# Patient Record
Sex: MALE | Race: WHITE | NOT HISPANIC OR LATINO
[De-identification: names, ages, dates, MRNs, and addresses within clinical notes are randomized per-mention and may not be internally consistent; named-entity substitution may affect disease eponyms.]

---

## 2017-12-19 PROBLEM — Z00.00 ENCOUNTER FOR PREVENTIVE HEALTH EXAMINATION: Status: ACTIVE | Noted: 2017-12-19

## 2018-02-21 ENCOUNTER — APPOINTMENT (OUTPATIENT)
Dept: VASCULAR SURGERY | Facility: CLINIC | Age: 58
End: 2018-02-21

## 2022-05-31 ENCOUNTER — APPOINTMENT (OUTPATIENT)
Dept: PAIN MANAGEMENT | Facility: CLINIC | Age: 62
End: 2022-05-31
Payer: COMMERCIAL

## 2022-05-31 VITALS
DIASTOLIC BLOOD PRESSURE: 84 MMHG | HEIGHT: 76 IN | TEMPERATURE: 98 F | BODY MASS INDEX: 38.36 KG/M2 | SYSTOLIC BLOOD PRESSURE: 164 MMHG | WEIGHT: 315 LBS

## 2022-05-31 DIAGNOSIS — M25.552 PAIN IN RIGHT HIP: ICD-10-CM

## 2022-05-31 DIAGNOSIS — M25.551 PAIN IN RIGHT HIP: ICD-10-CM

## 2022-05-31 DIAGNOSIS — Z87.39 PERSONAL HISTORY OF OTHER DISEASES OF THE MUSCULOSKELETAL SYSTEM AND CONNECTIVE TISSUE: ICD-10-CM

## 2022-05-31 PROCEDURE — 99204 OFFICE O/P NEW MOD 45 MIN: CPT

## 2022-05-31 RX ORDER — ESCITALOPRAM OXALATE 20 MG/1
20 TABLET ORAL
Qty: 30 | Refills: 0 | Status: DISCONTINUED | COMMUNITY
Start: 2021-05-17

## 2022-05-31 RX ORDER — METFORMIN HYDROCHLORIDE 500 MG/1
500 TABLET, COATED ORAL
Refills: 0 | Status: DISCONTINUED | COMMUNITY

## 2022-05-31 RX ORDER — AFLIBERCEPT 40 MG/ML
2 INJECTION, SOLUTION INTRAVITREAL
Qty: 1 | Refills: 0 | Status: DISCONTINUED | COMMUNITY
Start: 2021-10-15

## 2022-05-31 NOTE — HISTORY OF PRESENT ILLNESS
[6] : 1. What number best describes your pain on average in the past week? 6/10 pain [5] : 3. What number best describes how, during the past week, pain has interfered with your general activity? 5/10 pain [___ mths] : [unfilled] month(s) ago [Constant] : constant [7] : a minimum pain level of 7/10 [8] : a maximum pain level of 8/10 [Sharp] : sharp [Dull] : dull [Aching] : aching [Walking] : walking [Medications] : medications [Heat] : heat [Ice] : ice [FreeTextEntry1] : 61 yom presents w/ severe low back and hip pain. He has undergone multiple interventions with his family member  Dr. Sims previously with relief. Pain in his low back is extreme. Quality of life is impaired. There has been a severe exacerbation of the patient's chronic pain. He does not have the severe radicular pain that he has had previously. \par \par Interventions:\par C7-T1 Interlaminar WILLIS (07/15/20):\par L4-L5 Interlaminar WILLIS (07/13/18):\par Right L5-S1 TFESI (06/20/18):\par C7-T1 interlaminar WILLIS (12/12/16):\par Cervical WILLIS (12/03/15):\par Cerviadl WILLIS (11/16/15):\par  [FreeTextEntry2] : 16 [FreeTextEntry7] : Referred by Dr. Sims. Lower back and left hip  [FreeTextEntry4] : stretch

## 2022-05-31 NOTE — DATA REVIEWED
[FreeTextEntry1] : Exam Date: 06/20/18 \par Exam: MRI LUMBAR SPINE \par  \par The lumbar curvature and alignment is grossly maintained. The marrow signal is overall within normal limits with no focal marrow replacing lesion identified. \par An atypical hemangioma is noted in the L1 vertebral body. The vertebral body heights are overall maintained and there is no evidence of acute fracture or \par subluxation. Degenerative endplate signal changes are noted surrounding the L5-S1 intervertebral disc. There is otherwise no abnormal vertebral or paraspinal \par edema. \par In the visualized paraspinal soft tissues, there is dilatation of the right renal pelvis and calyces without dilatation of the right ureter. There is no \par definite edema around the right kidney to suggest acute obstruction. The paraspinal soft tissues otherwise appear grossly unremarkable. \par The conus medullaris terminates at L1 and the thecal sac terminates at S2. No abnormal signal is identified in the spinal cord. \par L1-2: No significant disc bulge or herniation. No significant central canal, subarticular, or foraminal stenosis. \par L2-3: No significant disc bulge or herniation. Mild facet/ligamentous hypertrophy. No significant central canal, subarticular, or foraminal stenosis. \par L3-4: No significant disc bulge or herniation. Mild facet/ligament hypertrophy. No significant central canal, subarticular, or foraminal stenosis. Focal \par anterolateral productive change of the right facet joint results mildly encroaches upon the exiting L3 nerve root. \par L4-5: Mild disc bulge with a small annular fissure. Mild facet/ligamentous hypertrophy. No significant central canal stenosis. Mild bilateral subarticular zone \par stenosis. Mild to moderate left foraminal stenosis and no significant right foraminal stenosis. \par L5-S1: Central/left paracentral disc extrusion, right paracentral/foraminal disc protrusion, and left foraminal disc protrusion. No significant central canal \par stenosis. Bilateral subarticular zone stenosis with encroachment upon both descending S1 nerve roots. Severe left foraminal stenosis with encroachment upon the \par exiting left L5 nerve. Moderate right foraminal stenosis. \par IMPRESSION: \par 1. Disc herniations at L5-S1 encroach upon both descending S1 nerve roots and the exiting left L5 nerve root. \par 2. At L3-4, right facet hypertrophy encroaches upon the exiting right L3 nerve root. \par 3. Lumbar spondylosis as above. No significant central canal stenosis. \par 4. Dilatation of the right renal pelvis and calyces. Further evaluation CT urogram and urologic consultation is recommended, if this has not already been \par evaluated at an outside institution.

## 2022-05-31 NOTE — PHYSICAL EXAM

## 2022-06-17 ENCOUNTER — APPOINTMENT (OUTPATIENT)
Dept: PAIN MANAGEMENT | Facility: CLINIC | Age: 62
End: 2022-06-17
Payer: COMMERCIAL

## 2022-06-17 PROCEDURE — 99214 OFFICE O/P EST MOD 30 MIN: CPT

## 2022-06-17 NOTE — HISTORY OF PRESENT ILLNESS
[___ mths] : [unfilled] month(s) ago Is This A New Presentation, Or A Follow-Up?: Facial Scar [Constant] : constant [7] : a minimum pain level of 7/10 [8] : a maximum pain level of 8/10 [Sharp] : sharp [Dull] : dull [Aching] : aching [Walking] : walking [Medications] : medications [Heat] : heat [Ice] : ice [6] : 1. What number best describes your pain on average in the past week? 6/10 pain [5] : 3. What number best describes how, during the past week, pain has interfered with your general activity? 5/10 pain [FreeTextEntry1] : Interval History:\par Patient returns today to review recent MRI. Pain remains severe. Pain is worst in his back. He still does have left low back pain that causes a dull ache in his testicle. \par \par HPI: 61 yom presents w/ severe low back and hip pain. He has undergone multiple interventions with his family member  Dr. Sims previously with relief. Pain in his low back is extreme. Quality of life is impaired. There has been a severe exacerbation of the patient's chronic pain. He does not have the severe radicular pain that he has had previously. \par \par Interventions:\par C7-T1 Interlaminar WILLIS (07/15/20):\par L4-L5 Interlaminar WILLIS (07/13/18):\par Right L5-S1 TFESI (06/20/18):\par C7-T1 interlaminar WILLIS (12/12/16):\par Cervical WILLIS (12/03/15):\par Cerviadl WILLIS (11/16/15):\par  [FreeTextEntry7] : Referred by Dr. Sims. Lower back and left hip  [FreeTextEntry4] : stretch  [FreeTextEntry2] : 16

## 2022-06-17 NOTE — ASSESSMENT
[FreeTextEntry1] : 61 yom presents w/ severe low back pain. Quality of life is impaired. There has been a severe exacerbation of the patient's chronic pain. \par \par I have personally reviewed the patient's MRI in detail and discussed it with them which is significant for multiple levels of foraminal and central stenosis and facet arthropathy.\par \par The patient has moderate to severe chronic axial back pain which has been present for at least three months and has failed to improve with conservative therapy. There is no untreated radiculopathy. There is no other known cause of axial back pain in this patient. The patient has failed to have relief with medication management and physical therapy. Given the patients failure to improve with all other conservative measures, recommend bilateral L3, L4, and L5 medial branch diagnostic block under fluoroscopic guidance. If the patient has significant relief of pain and improved quality of life following diagnostic block, will proceed to radiofrequency ablation.\par \par I have discussed in detail with the patient that an interventional spine procedure is associated with potential risks. The procedure may include an injection of steroid and potentially other medications (local anesthetic and normal saline) into the epidural space or surrounding tissue of the spine. There are significant risks of this procedure which include and are not limited to infection, bleeding, worsening pain, dural puncture leading to post-dural puncture headache, nerve damage, spinal cord injury, paralysis, stroke, and death. There is a chance that the procedure does not improve their pain. There are risks associated with the steroid being absorbed into the body systemically. These include dysphoria, difficulty sleeping, mood swings, and personality changes. Pre-menopausal women may notice a regularity his in her menstrual cycle for 2-3 months following the injection. Steroids can specifically affect patients with hypertension, diabetes, and peptic ulcers. The procedure may cause a temporary increase in blood pressure and blood glucose, and may adversely affect a peptic ulcer. Other, more rare complications, including avascular necrosis of the joints, glaucoma, and osteoporosis. I have discussed the risks of the procedure at length with the patient, and the potential benefits of pain relief. I have offered alternatives to the procedure. All questions were answered. The patient expressed understanding and wishes to proceed with the procedure.\par \par Physical therapy prescribed - goal will be to increase ROM, strengthening, postural training, other modalities ad yash which may include massage and stim. Goals of therapy discussed with the patient in detail and will be discussed with physical therapist. Patient will follow-up following course of physical therapy to monitor progress and adjust therapy as needed.\par \par Acetaminophen 1,000 mg q8h prn for moderate pain. Risks, benefits, and alternatives of acetaminophen discussed with patient.\par \par Diet and nutritional strategies discussed which may improve patients pain and will improve overall health.\par

## 2022-06-17 NOTE — PHYSICAL EXAM

## 2022-06-17 NOTE — DATA REVIEWED
[FreeTextEntry1] : MRI Lumbar Spine (2022):\par L4-L5: diffuse disc bulge extending into the foramina bilaterally\par L5-S1: posterior disc herniation more prominent to the left. Impinging on the L5 and S1 nerve roots.\par \par Exam Date: 06/20/18 \par Exam: MRI LUMBAR SPINE \par  \par The lumbar curvature and alignment is grossly maintained. The marrow signal is overall within normal limits with no focal marrow replacing lesion identified. \par An atypical hemangioma is noted in the L1 vertebral body. The vertebral body heights are overall maintained and there is no evidence of acute fracture or \par subluxation. Degenerative endplate signal changes are noted surrounding the L5-S1 intervertebral disc. There is otherwise no abnormal vertebral or paraspinal \par edema. \par In the visualized paraspinal soft tissues, there is dilatation of the right renal pelvis and calyces without dilatation of the right ureter. There is no \par definite edema around the right kidney to suggest acute obstruction. The paraspinal soft tissues otherwise appear grossly unremarkable. \par The conus medullaris terminates at L1 and the thecal sac terminates at S2. No abnormal signal is identified in the spinal cord. \par L1-2: No significant disc bulge or herniation. No significant central canal, subarticular, or foraminal stenosis. \par L2-3: No significant disc bulge or herniation. Mild facet/ligamentous hypertrophy. No significant central canal, subarticular, or foraminal stenosis. \par L3-4: No significant disc bulge or herniation. Mild facet/ligament hypertrophy. No significant central canal, subarticular, or foraminal stenosis. Focal \par anterolateral productive change of the right facet joint results mildly encroaches upon the exiting L3 nerve root. \par L4-5: Mild disc bulge with a small annular fissure. Mild facet/ligamentous hypertrophy. No significant central canal stenosis. Mild bilateral subarticular zone \par stenosis. Mild to moderate left foraminal stenosis and no significant right foraminal stenosis. \par L5-S1: Central/left paracentral disc extrusion, right paracentral/foraminal disc protrusion, and left foraminal disc protrusion. No significant central canal \par stenosis. Bilateral subarticular zone stenosis with encroachment upon both descending S1 nerve roots. Severe left foraminal stenosis with encroachment upon the \par exiting left L5 nerve. Moderate right foraminal stenosis. \par IMPRESSION: \par 1. Disc herniations at L5-S1 encroach upon both descending S1 nerve roots and the exiting left L5 nerve root. \par 2. At L3-4, right facet hypertrophy encroaches upon the exiting right L3 nerve root. \par 3. Lumbar spondylosis as above. No significant central canal stenosis. \par 4. Dilatation of the right renal pelvis and calyces. Further evaluation CT urogram and urologic consultation is recommended, if this has not already been \par evaluated at an outside institution.

## 2022-07-12 ENCOUNTER — RESULT REVIEW (OUTPATIENT)
Age: 62
End: 2022-07-12

## 2022-07-12 ENCOUNTER — TRANSCRIPTION ENCOUNTER (OUTPATIENT)
Age: 62
End: 2022-07-12

## 2022-07-12 ENCOUNTER — APPOINTMENT (OUTPATIENT)
Dept: PAIN MANAGEMENT | Facility: HOSPITAL | Age: 62
End: 2022-07-12

## 2022-07-14 ENCOUNTER — APPOINTMENT (OUTPATIENT)
Dept: PAIN MANAGEMENT | Facility: CLINIC | Age: 62
End: 2022-07-14

## 2022-07-14 PROCEDURE — 99214 OFFICE O/P EST MOD 30 MIN: CPT | Mod: 95

## 2022-07-14 NOTE — PHYSICAL EXAM
[de-identified] : Constitutional: Well-developed, in no acute distress\par \par Psychiatric: Appropriate mood and affect, oriented to time, place, person, and situation\par

## 2022-07-14 NOTE — ASSESSMENT
[FreeTextEntry1] : 61 yom presents w/ severe low back pain into the left leg. No relief w/ lumbar medial branch block. Quality of life is impaired. There has been a severe exacerbation of the patient's chronic pain. \par \par I have personally reviewed the patient's MRI in detail and discussed it with them which is significant for multiple levels of foraminal and central stenosis and facet arthropathy.\par \par The patient has failed to have relief with medication management. The patient has failed to have relief with more then six weeks of physical therapy within the last three months. Given the patients failure to improve with all other conservative measures, recommend L5-S1 interlaminar epidural steroid injection under fluoroscopic guidance. The patient will follow-up with me in my office two weeks following intervention.\par \par \par I have discussed in detail with the patient that an interventional spine procedure is associated with potential risks. The procedure may include an injection of steroid and potentially other medications (local anesthetic and normal saline) into the epidural space or surrounding tissue of the spine. There are significant risks of this procedure which include and are not limited to infection, bleeding, worsening pain, dural puncture leading to post-dural puncture headache, nerve damage, spinal cord injury, paralysis, stroke, and death. There is a chance that the procedure does not improve their pain. There are risks associated with the steroid being absorbed into the body systemically. These include dysphoria, difficulty sleeping, mood swings, and personality changes. Pre-menopausal women may notice a regularity his in her menstrual cycle for 2-3 months following the injection. Steroids can specifically affect patients with hypertension, diabetes, and peptic ulcers. The procedure may cause a temporary increase in blood pressure and blood glucose, and may adversely affect a peptic ulcer. Other, more rare complications, including avascular necrosis of the joints, glaucoma, and osteoporosis. I have discussed the risks of the procedure at length with the patient, and the potential benefits of pain relief. I have offered alternatives to the procedure. All questions were answered. The patient expressed understanding and wishes to proceed with the procedure.\par \par Physical therapy prescribed - goal will be to increase ROM, strengthening, postural training, other modalities ad yash which may include massage and stim. Goals of therapy discussed with the patient in detail and will be discussed with physical therapist. Patient will follow-up following course of physical therapy to monitor progress and adjust therapy as needed.\par \par Acetaminophen 1,000 mg q8h prn for moderate pain. Risks, benefits, and alternatives of acetaminophen discussed with patient.\par \par Diet and nutritional strategies discussed which may improve patients pain and will improve overall health.\par \par Patient is scheduled for procedure based on history, imaging and limited physical exam performed on TeleHealth visit.  If necessary, additional focal physical exam will be performed on date of procedure\par \par

## 2022-07-14 NOTE — HISTORY OF PRESENT ILLNESS
[___ mths] : [unfilled] month(s) ago [Constant] : constant [7] : a minimum pain level of 7/10 [8] : a maximum pain level of 8/10 [Sharp] : sharp [Dull] : dull [Aching] : aching [Walking] : walking [Medications] : medications [Heat] : heat [Ice] : ice [6] : 1. What number best describes your pain on average in the past week? 6/10 pain [5] : 3. What number best describes how, during the past week, pain has interfered with your general activity? 5/10 pain [FreeTextEntry1] : Verbal consent was given by/on: Graham Schofield on 07/14/22\par \par Patient location: Home\par \par Physician location: Office\par \par Reason for Telehealth visit: Back pain\par \par He is now s/p bilateral L3, L4, and L5 medial branch block. He had some improvement in range of motion but not substantial pain relief. Pain in his back into the left leg remains severe. Quality of life is impaired. There has been a severe exacerbation of the patient's chronic pain. No relief w/ six weeks of NSAIDS. No relief w/ PT. \par \par This service took place using a two way audio and visual platform. The patient and Dr. Lee were both able to see each other and communicate through video. There were no barriers to communication. Greater then 50% of the time spent in the encounter involved counseling and coordination of care. \par \par Time spent on visit: 30 minutes\par \par HPI: 61 yom presents w/ severe low back and hip pain. He has undergone multiple interventions with his family member  Dr. Sims previously with relief. Pain in his low back is extreme. Quality of life is impaired. There has been a severe exacerbation of the patient's chronic pain. He does not have the severe radicular pain that he has had previously. \par \par He does have left low back pain that causes aching in his testicle.\par \par Interventions:\par C7-T1 Interlaminar WILLIS (07/15/20):\par L4-L5 Interlaminar WILLIS (07/13/18):\par Right L5-S1 TFESI (06/20/18):\par C7-T1 interlaminar WILLIS (12/12/16):\par Cervical WILLIS (12/03/15):\par Cerviadl WILLIS (11/16/15):\par  [FreeTextEntry7] : Referred by Dr. Sims. Lower back and left hip  [FreeTextEntry4] : stretch  [FreeTextEntry2] : 16

## 2022-11-17 ENCOUNTER — NON-APPOINTMENT (OUTPATIENT)
Age: 62
End: 2022-11-17

## 2022-11-17 ENCOUNTER — APPOINTMENT (OUTPATIENT)
Dept: PAIN MANAGEMENT | Facility: CLINIC | Age: 62
End: 2022-11-17

## 2022-11-17 PROCEDURE — 99443: CPT

## 2022-11-17 RX ORDER — MUPIROCIN 20 MG/G
2 OINTMENT TOPICAL
Qty: 22 | Refills: 0 | Status: ACTIVE | COMMUNITY
Start: 2022-11-01

## 2022-11-17 RX ORDER — TIZANIDINE 2 MG/1
2 TABLET ORAL
Qty: 45 | Refills: 0 | Status: ACTIVE | COMMUNITY
Start: 2022-11-17 | End: 1900-01-01

## 2022-11-17 RX ORDER — AMOXICILLIN AND CLAVULANATE POTASSIUM 875; 125 MG/1; MG/1
875-125 TABLET, COATED ORAL
Qty: 20 | Refills: 0 | Status: ACTIVE | COMMUNITY
Start: 2022-11-01

## 2022-11-22 ENCOUNTER — APPOINTMENT (OUTPATIENT)
Dept: PAIN MANAGEMENT | Facility: CLINIC | Age: 62
End: 2022-11-22

## 2022-11-22 VITALS
HEIGHT: 76 IN | TEMPERATURE: 98 F | WEIGHT: 315 LBS | DIASTOLIC BLOOD PRESSURE: 81 MMHG | BODY MASS INDEX: 38.36 KG/M2 | SYSTOLIC BLOOD PRESSURE: 155 MMHG

## 2022-11-22 DIAGNOSIS — M47.817 SPONDYLOSIS W/OUT MYELOPATHY OR RADICULOPATHY, LUMBOSACRAL REGION: ICD-10-CM

## 2022-11-22 DIAGNOSIS — Z87.39 PERSONAL HISTORY OF OTHER DISEASES OF THE MUSCULOSKELETAL SYSTEM AND CONNECTIVE TISSUE: ICD-10-CM

## 2022-11-22 PROCEDURE — 99214 OFFICE O/P EST MOD 30 MIN: CPT

## 2022-11-22 NOTE — HISTORY OF PRESENT ILLNESS
[5] : 3. What number best describes how, during the past week, pain has interfered with your general activity? 5/10 pain [6] : 1. What number best describes your pain on average in the past week? 6/10 pain [___ mths] : [unfilled] month(s) ago [Constant] : constant [7] : a minimum pain level of 7/10 [8] : a maximum pain level of 8/10 [Sharp] : sharp [Dull] : dull [Aching] : aching [Walking] : walking [Medications] : medications [Heat] : heat [Ice] : ice [FreeTextEntry1] : Interval History:\par Patient returns with severe mid back pain. Pain radiates predominantly on the right side. Pain occurred after a fall. Quality of life is impaired. There has been a severe exacerbation of the patient's chronic pain. \par \par HPI: 61 yom presents w/ severe low back and hip pain. He has undergone multiple interventions with his family member  Dr. Sims previously with relief. Pain in his low back is extreme. Quality of life is impaired. There has been a severe exacerbation of the patient's chronic pain. He does not have the severe radicular pain that he has had previously. \par \par He does have left low back pain that causes aching in his testicle.\par \par Interventions:\par C7-T1 Interlaminar WILLIS (07/15/20):\par L4-L5 Interlaminar WILLIS (07/13/18):\par Right L5-S1 TFESI (06/20/18):\par C7-T1 interlaminar WILLIS (12/12/16):\par Cervical WILLIS (12/03/15):\par Cerviadl WILLIS (11/16/15):\par  [FreeTextEntry2] : 16 [FreeTextEntry7] : Referred by Dr. Sims. Lower back and left hip  [FreeTextEntry4] : stretch

## 2022-11-22 NOTE — ASSESSMENT
[FreeTextEntry1] : 61 yom presents w/ severe low back pain into the left leg. No relief w/ lumbar medial branch block. Quality of life is impaired. There has been a severe exacerbation of the patient's chronic pain. \par \par I have personally reviewed the patient's MRI in detail and discussed it with them which is significant for multiple levels of foraminal and central stenosis and facet arthropathy.\par \par The patient has failed to have relief with over six weeks of physical therapy within the last three months and all medications. GIven their failure to improve with all other conservative measures recommend MRI thoracic spine. Patient will return to review imaging and plan for potential intervention.\par \par Physical therapy prescribed - goal will be to increase ROM, strengthening, postural training, other modalities ad yash which may include massage and stim. Goals of therapy discussed with the patient in detail and will be discussed with physical therapist. Patient will follow-up following course of physical therapy to monitor progress and adjust therapy as needed.\par \par Acetaminophen 1,000 mg q8h prn for moderate pain. Risks, benefits, and alternatives of acetaminophen discussed with patient.\par \par Ibuprofen 600 mg q8h prn add when pain is not adequately controlled with acetaminophen. Risks, benefits, and alternatives of ibuprofen discussed with patient.\par \par Diet and nutritional strategies discussed which may improve patients pain and will improve overall health.\par \par

## 2022-11-22 NOTE — PHYSICAL EXAM
[de-identified] : Constitutional: Well-developed, in no acute distress\par \par Psychiatric: Appropriate mood and affect, oriented to time, place, person, and situation\par

## 2022-11-29 ENCOUNTER — APPOINTMENT (OUTPATIENT)
Dept: PAIN MANAGEMENT | Facility: CLINIC | Age: 62
End: 2022-11-29

## 2022-11-29 VITALS
TEMPERATURE: 98 F | BODY MASS INDEX: 38.36 KG/M2 | DIASTOLIC BLOOD PRESSURE: 76 MMHG | WEIGHT: 315 LBS | SYSTOLIC BLOOD PRESSURE: 140 MMHG | HEIGHT: 76 IN

## 2022-11-29 DIAGNOSIS — M79.10 MYALGIA, UNSPECIFIED SITE: ICD-10-CM

## 2022-11-29 DIAGNOSIS — M47.817 SPONDYLOSIS W/OUT MYELOPATHY OR RADICULOPATHY, LUMBOSACRAL REGION: ICD-10-CM

## 2022-11-29 DIAGNOSIS — M47.24 OTHER SPONDYLOSIS WITH RADICULOPATHY, THORACIC REGION: ICD-10-CM

## 2022-11-29 DIAGNOSIS — M54.16 RADICULOPATHY, LUMBAR REGION: ICD-10-CM

## 2022-11-29 PROCEDURE — 99214 OFFICE O/P EST MOD 30 MIN: CPT | Mod: 25

## 2022-11-29 PROCEDURE — 20553 NJX 1/MLT TRIGGER POINTS 3/>: CPT

## 2022-11-29 NOTE — ASSESSMENT
[FreeTextEntry1] : 61 yom presents w/ severe mid back pain that radiates around the right side of his abdomen.\par \par I have personally reviewed the patient's MRI in detail and discussed it with them which is significant for  disc bulging at T7-T10, there is no significant canal or foraminal stenosis.\par \par The patient has failed to have relief with medication management and physical therapy. Given the patients failure to improve with all other conservative measures, recommend T9-T10 interlaminar epidural steroid injection under fluoroscopic guidance. The patient will follow-up with me in my office two weeks following intervention.\par \par I have discussed in detail with the patient that an interventional spine procedure is associated with potential risks. The procedure may include an injection of steroid and potentially other medications (local anesthetic and normal saline) into the epidural space or surrounding tissue of the spine. There are significant risks of this procedure which include and are not limited to infection, bleeding, worsening pain, dural puncture leading to post-dural puncture headache, nerve damage, spinal cord injury, paralysis, stroke, and death. There is a chance that the procedure does not improve their pain. There are risks associated with the steroid being absorbed into the body systemically. These include dysphoria, difficulty sleeping, mood swings, and personality changes. Pre-menopausal women may notice a regularity his in her menstrual cycle for 2-3 months following the injection. Steroids can specifically affect patients with hypertension, diabetes, and peptic ulcers. The procedure may cause a temporary increase in blood pressure and blood glucose, and may adversely affect a peptic ulcer. Other, more rare complications, including avascular necrosis of the joints, glaucoma, and osteoporosis. I have discussed the risks of the procedure at length with the patient, and the potential benefits of pain relief. I have offered alternatives to the procedure. All questions were answered. The patient expressed understanding and wishes to proceed with the procedure.\par \par Patients back was prepped and draped in sterile fashion. Chloroprep was used to prep this skin. Following this the muscle spasms were palpated. a 25 gauge needle was used to administer a total of 10 cc of 0.25% bupivacaine in the trapezius, lumbar, and thoracic paraspinal muscles. Patient was monitored afterwards and had no adverse events.\par \par He previously has had severe low back pain into the left leg. No relief w/ lumbar medial branch block. \par \par I have personally reviewed the patient's MRI in detail and discussed it with them which is significant for multiple levels of foraminal and central stenosis and facet arthropathy.\par \par Physical therapy prescribed - goal will be to increase ROM, strengthening, postural training, other modalities ad yash which may include massage and stim. Goals of therapy discussed with the patient in detail and will be discussed with physical therapist. Patient will follow-up following course of physical therapy to monitor progress and adjust therapy as needed.\par \par Acetaminophen 1,000 mg q8h prn for moderate pain. Risks, benefits, and alternatives of acetaminophen discussed with patient.\par \par Ibuprofen 600 mg q8h prn add when pain is not adequately controlled with acetaminophen. Risks, benefits, and alternatives of ibuprofen discussed with patient.\par \par Diet and nutritional strategies discussed which may improve patients pain and will improve overall health.\par \par

## 2022-11-29 NOTE — DATA REVIEWED
[FreeTextEntry1] : MRI Thoracic Spine (11/23/22):\par Disc bulges from T7-T8 through T9-T10\par \par MRI Lumbar Spine (2022):\par L4-L5: diffuse disc bulge extending into the foramina bilaterally\par L5-S1: posterior disc herniation more prominent to the left. Impinging on the L5 and S1 nerve roots.\par \par Exam Date: 06/20/18 \par Exam: MRI LUMBAR SPINE \par  \par The lumbar curvature and alignment is grossly maintained. The marrow signal is overall within normal limits with no focal marrow replacing lesion identified. \par An atypical hemangioma is noted in the L1 vertebral body. The vertebral body heights are overall maintained and there is no evidence of acute fracture or \par subluxation. Degenerative endplate signal changes are noted surrounding the L5-S1 intervertebral disc. There is otherwise no abnormal vertebral or paraspinal \par edema. \par In the visualized paraspinal soft tissues, there is dilatation of the right renal pelvis and calyces without dilatation of the right ureter. There is no \par definite edema around the right kidney to suggest acute obstruction. The paraspinal soft tissues otherwise appear grossly unremarkable. \par The conus medullaris terminates at L1 and the thecal sac terminates at S2. No abnormal signal is identified in the spinal cord. \par L1-2: No significant disc bulge or herniation. No significant central canal, subarticular, or foraminal stenosis. \par L2-3: No significant disc bulge or herniation. Mild facet/ligamentous hypertrophy. No significant central canal, subarticular, or foraminal stenosis. \par L3-4: No significant disc bulge or herniation. Mild facet/ligament hypertrophy. No significant central canal, subarticular, or foraminal stenosis. Focal \par anterolateral productive change of the right facet joint results mildly encroaches upon the exiting L3 nerve root. \par L4-5: Mild disc bulge with a small annular fissure. Mild facet/ligamentous hypertrophy. No significant central canal stenosis. Mild bilateral subarticular zone \par stenosis. Mild to moderate left foraminal stenosis and no significant right foraminal stenosis. \par L5-S1: Central/left paracentral disc extrusion, right paracentral/foraminal disc protrusion, and left foraminal disc protrusion. No significant central canal \par stenosis. Bilateral subarticular zone stenosis with encroachment upon both descending S1 nerve roots. Severe left foraminal stenosis with encroachment upon the \par exiting left L5 nerve. Moderate right foraminal stenosis. \par IMPRESSION: \par 1. Disc herniations at L5-S1 encroach upon both descending S1 nerve roots and the exiting left L5 nerve root. \par 2. At L3-4, right facet hypertrophy encroaches upon the exiting right L3 nerve root. \par 3. Lumbar spondylosis as above. No significant central canal stenosis. \par 4. Dilatation of the right renal pelvis and calyces. Further evaluation CT urogram and urologic consultation is recommended, if this has not already been \par evaluated at an outside institution.

## 2022-11-29 NOTE — PHYSICAL EXAM

## 2022-11-29 NOTE — HISTORY OF PRESENT ILLNESS
[6] : 1. What number best describes your pain on average in the past week? 6/10 pain [5] : 3. What number best describes how, during the past week, pain has interfered with your general activity? 5/10 pain [___ mths] : [unfilled] month(s) ago [Constant] : constant [7] : a minimum pain level of 7/10 [8] : a maximum pain level of 8/10 [Sharp] : sharp [Dull] : dull [Aching] : aching [Walking] : walking [Medications] : medications [Heat] : heat [Ice] : ice [FreeTextEntry1] : Interval History:\par Patient returns with severe mid back pain. Pain radiates predominantly on the right side. Pain occurred after a fall. Quality of life is impaired. There has been a severe exacerbation of the patient's chronic pain. His abdomen tightens up. He has never had pain like this prior. Pain has been present for three weeks. \par \par HPI: 61 yom presents w/ severe low back and hip pain. He has undergone multiple interventions with his family member  Dr. Sims previously with relief. Pain in his low back is extreme. Quality of life is impaired. There has been a severe exacerbation of the patient's chronic pain. He does not have the severe radicular pain that he has had previously. \par \par He does have left low back pain that causes aching in his testicle.\par \par Interventions:\par C7-T1 Interlaminar WILLIS (07/15/20):\par L4-L5 Interlaminar WILLIS (07/13/18):\par Right L5-S1 TFESI (06/20/18):\par C7-T1 interlaminar WILLIS (12/12/16):\par Cervical WILLIS (12/03/15):\par Cerviadl WILLIS (11/16/15):\par  [FreeTextEntry2] : 17 [FreeTextEntry7] : Referred by Dr. Sims. Lower back and left hip  [FreeTextEntry4] : stretch

## 2022-12-05 ENCOUNTER — APPOINTMENT (OUTPATIENT)
Dept: PAIN MANAGEMENT | Facility: HOSPITAL | Age: 62
End: 2022-12-05

## 2022-12-05 ENCOUNTER — TRANSCRIPTION ENCOUNTER (OUTPATIENT)
Age: 62
End: 2022-12-05

## 2023-08-02 ENCOUNTER — TRANSCRIPTION ENCOUNTER (OUTPATIENT)
Age: 63
End: 2023-08-02